# Patient Record
Sex: FEMALE | Race: WHITE | NOT HISPANIC OR LATINO | URBAN - NONMETROPOLITAN AREA
[De-identification: names, ages, dates, MRNs, and addresses within clinical notes are randomized per-mention and may not be internally consistent; named-entity substitution may affect disease eponyms.]

---

## 2018-01-30 NOTE — PATIENT DISCUSSION
Recommended YAG capsulotomy evaluation with Dr. Linus Crawford. YAG surgery will likely help 5-10% for patient although vision will be affected due to end stage macular degeneration.

## 2018-02-08 NOTE — PATIENT DISCUSSION
Recommended YAG capsulotomy evaluation with Dr. Belgica Wong. YAG surgery will likely help 5-10% for patient although vision will be affected due to end stage macular degeneration.

## 2018-02-08 NOTE — PATIENT DISCUSSION
Recommended YAG capsulotomy evaluation with Dr. Sasha Brannon. YAG surgery will likely help 5-10% for patient although vision will be affected due to end stage macular degeneration.

## 2018-02-08 NOTE — PROCEDURE NOTE: SURGICAL
Prior to commencing surgery patient identification, surgical procedure, site, and side were confirmed by Dr. Felicia Weinberg. Following topical proparacaine anesthesia, the patient was positioned at the YAG laser, a contact lens coupled to the cornea with methylcellulose and an axial posterior capsulotomy performed without complication using 2.9 Mj x 74. Excess methylcellulose was washed from the eye, one drop of Alphagan was instilled and the patient returned to the holding area having tolerated the procedure well and without complication. <br />[id number]

## 2018-02-15 NOTE — PATIENT DISCUSSION
Recommended YAG capsulotomy evaluation with Dr. Emely Lee. YAG surgery will likely help 5-10% for patient although vision will be affected due to end stage macular degeneration.

## 2018-04-19 NOTE — PATIENT DISCUSSION
Recommended YAG capsulotomy evaluation with Dr. Fallon Sharpe. YAG surgery will likely help 5-10% for patient although vision will be affected due to end stage macular degeneration.

## 2019-03-29 NOTE — PATIENT DISCUSSION
Recommended YAG capsulotomy evaluation with Dr. Marvin Portillo. YAG surgery will likely help 5-10% for patient although vision will be affected due to end stage macular degeneration.

## 2019-06-04 NOTE — PATIENT DISCUSSION
Recommended YAG capsulotomy evaluation with Dr. Kika Pizarro. YAG surgery will likely help 5-10% for patient although vision will be affected due to end stage macular degeneration.

## 2019-06-04 NOTE — PATIENT DISCUSSION
Increased atrophy, AMD remains persistent but without progression. Continue with Amsler grid and AREDS 2. Avoid direct or indirect smoking. The possibility of progression was discussed.

## 2019-08-07 ENCOUNTER — IMPORTED ENCOUNTER (OUTPATIENT)
Dept: URBAN - NONMETROPOLITAN AREA CLINIC 1 | Facility: CLINIC | Age: 44
End: 2019-08-07

## 2019-08-07 PROCEDURE — 92310 CONTACT LENS FITTING OU: CPT

## 2019-08-07 PROCEDURE — 92015 DETERMINE REFRACTIVE STATE: CPT

## 2019-08-07 PROCEDURE — 92014 COMPRE OPH EXAM EST PT 1/>: CPT

## 2020-11-04 ENCOUNTER — IMPORTED ENCOUNTER (OUTPATIENT)
Dept: URBAN - NONMETROPOLITAN AREA CLINIC 1 | Facility: CLINIC | Age: 45
End: 2020-11-04

## 2020-11-04 PROBLEM — H20.042: Noted: 2020-11-04

## 2020-11-04 PROCEDURE — 99213 OFFICE O/P EST LOW 20 MIN: CPT

## 2020-11-04 NOTE — PATIENT DISCUSSION
*Iritis:. OS-  Discussed findings of exam in detail with the patient. -  discussed the chronic nature of this disease and recurrent flare-ups. START ATROPINE BID OSSTART PF Q2H OS

## 2020-11-09 ENCOUNTER — IMPORTED ENCOUNTER (OUTPATIENT)
Dept: URBAN - NONMETROPOLITAN AREA CLINIC 1 | Facility: CLINIC | Age: 45
End: 2020-11-09

## 2020-11-09 PROCEDURE — 99213 OFFICE O/P EST LOW 20 MIN: CPT

## 2020-11-09 NOTE — PATIENT DISCUSSION
*Iritis:. OS improved-  Discussed findings of exam in detail with the patient. -  discussed the chronic nature of this disease and recurrent flare-ups.d/c  ATROPINE BID OStaper PF qid/tid/bid/qd x 1 wk each OS

## 2021-01-05 ENCOUNTER — IMPORTED ENCOUNTER (OUTPATIENT)
Dept: URBAN - NONMETROPOLITAN AREA CLINIC 1 | Facility: CLINIC | Age: 46
End: 2021-01-05

## 2021-01-05 PROBLEM — H52.13: Noted: 2021-01-05

## 2021-01-05 PROBLEM — H52.4: Noted: 2021-01-05

## 2021-01-05 PROBLEM — H52.223: Noted: 2021-01-05

## 2021-01-05 PROCEDURE — 92014 COMPRE OPH EXAM EST PT 1/>: CPT

## 2021-01-05 PROCEDURE — 92310 CONTACT LENS FITTING OU: CPT

## 2021-01-05 PROCEDURE — 92015 DETERMINE REFRACTIVE STATE: CPT

## 2022-01-26 ENCOUNTER — COMPREHENSIVE EXAM (OUTPATIENT)
Dept: RURAL CLINIC 1 | Facility: CLINIC | Age: 47
End: 2022-01-26

## 2022-01-26 DIAGNOSIS — H52.13: ICD-10-CM

## 2022-01-26 DIAGNOSIS — H52.4: ICD-10-CM

## 2022-01-26 DIAGNOSIS — H52.223: ICD-10-CM

## 2022-01-26 PROCEDURE — 92014 COMPRE OPH EXAM EST PT 1/>: CPT

## 2022-01-26 ASSESSMENT — VISUAL ACUITY
OD_CC: 20/30-1
OU_CC: 20/20
OS_CC: 20/20
OS_CC: 20/30-1
OD_CC: 20/20
OU_CC: 20/30-1

## 2022-01-26 ASSESSMENT — TONOMETRY
OD_IOP_MMHG: 17
OS_IOP_MMHG: 17

## 2022-04-15 ASSESSMENT — TONOMETRY
OD_IOP_MMHG: 17
OS_IOP_MMHG: 15
OS_IOP_MMHG: 17
OS_IOP_MMHG: 15
OD_IOP_MMHG: 18

## 2022-04-15 ASSESSMENT — VISUAL ACUITY
OU_CC: J1+
OS_SC: 20/20
OU_SC: J1+
OU_SC: J1+
OS_SC: 20/20
OU_SC: J1+
OS_SC: 20/20
OD_SC: 20/20
OS_SC: 20/20

## 2022-06-14 ENCOUNTER — EMERGENCY VISIT (OUTPATIENT)
Dept: RURAL CLINIC 1 | Facility: CLINIC | Age: 47
End: 2022-06-14

## 2022-06-14 DIAGNOSIS — S05.02XA: ICD-10-CM

## 2022-06-14 DIAGNOSIS — H52.13: ICD-10-CM

## 2022-06-14 PROCEDURE — 99214 OFFICE O/P EST MOD 30 MIN: CPT

## 2024-01-15 ENCOUNTER — EMERGENCY VISIT (OUTPATIENT)
Dept: RURAL CLINIC 2 | Facility: CLINIC | Age: 49
End: 2024-01-15

## 2024-01-15 DIAGNOSIS — H10.022: ICD-10-CM

## 2024-01-15 PROCEDURE — 99213 OFFICE O/P EST LOW 20 MIN: CPT

## 2024-01-15 ASSESSMENT — VISUAL ACUITY
OD_CC: 20/20
OS_CC: 20/20

## 2024-03-27 ENCOUNTER — ESTABLISHED PATIENT (OUTPATIENT)
Dept: RURAL CLINIC 2 | Facility: CLINIC | Age: 49
End: 2024-03-27

## 2024-03-27 DIAGNOSIS — H52.13: ICD-10-CM

## 2024-03-27 DIAGNOSIS — H52.4: ICD-10-CM

## 2024-03-27 PROCEDURE — 92014 COMPRE OPH EXAM EST PT 1/>: CPT

## 2024-03-27 PROCEDURE — 92310-E CONTACT LENS FITTING ESTABLISH PATIENT

## 2024-03-27 PROCEDURE — 92015 DETERMINE REFRACTIVE STATE: CPT

## 2024-03-27 ASSESSMENT — VISUAL ACUITY
OS_CC: 20/20
OD_CC: 20/20

## 2024-03-27 ASSESSMENT — TONOMETRY
OD_IOP_MMHG: 21
OS_IOP_MMHG: 21

## 2024-05-06 ENCOUNTER — ESTABLISHED PATIENT (OUTPATIENT)
Dept: RURAL CLINIC 2 | Facility: CLINIC | Age: 49
End: 2024-05-06

## 2024-05-06 DIAGNOSIS — H52.4: ICD-10-CM

## 2024-05-06 DIAGNOSIS — H52.13: ICD-10-CM

## 2024-05-06 PROCEDURE — 92310-E CONTACT LENS FITTING ESTABLISH PATIENT: Mod: NC

## 2024-12-02 ENCOUNTER — EMERGENCY VISIT (OUTPATIENT)
Age: 49
End: 2024-12-02

## 2024-12-02 DIAGNOSIS — S05.01XA: ICD-10-CM

## 2024-12-02 PROCEDURE — 99213 OFFICE O/P EST LOW 20 MIN: CPT

## 2025-08-12 ENCOUNTER — COMPREHENSIVE EXAM (OUTPATIENT)
Age: 50
End: 2025-08-12

## 2025-08-12 DIAGNOSIS — H52.13: ICD-10-CM

## 2025-08-12 DIAGNOSIS — H16.223: ICD-10-CM

## 2025-08-12 DIAGNOSIS — H52.4: ICD-10-CM

## 2025-08-12 PROCEDURE — 92015 DETERMINE REFRACTIVE STATE: CPT

## 2025-08-12 PROCEDURE — 92310-1 LEVEL 1 SOFT LENS UPDATE

## 2025-08-12 PROCEDURE — 99213 OFFICE O/P EST LOW 20 MIN: CPT
